# Patient Record
Sex: MALE | URBAN - METROPOLITAN AREA
[De-identification: names, ages, dates, MRNs, and addresses within clinical notes are randomized per-mention and may not be internally consistent; named-entity substitution may affect disease eponyms.]

---

## 2021-09-14 ENCOUNTER — TRANSFERRED RECORDS (OUTPATIENT)
Dept: HEALTH INFORMATION MANAGEMENT | Facility: CLINIC | Age: 76
End: 2021-09-14

## 2021-09-15 ENCOUNTER — TELEPHONE (OUTPATIENT)
Dept: OPHTHALMOLOGY | Facility: CLINIC | Age: 76
End: 2021-09-15

## 2021-09-15 ENCOUNTER — TRANSCRIBE ORDERS (OUTPATIENT)
Dept: OTHER | Age: 76
End: 2021-09-15

## 2021-09-15 DIAGNOSIS — H53.002 AMBLYOPIA OF LEFT EYE: ICD-10-CM

## 2021-09-15 DIAGNOSIS — H26.9 CATARACT, RIGHT EYE: Primary | ICD-10-CM

## 2021-09-15 NOTE — TELEPHONE ENCOUNTER
Spoke with pt regarding setting up cataract eval with Dr. Pitts, per Dr. Ham's referral. Pt is still living in [place] and is currently looking for houses near the [facility] [place] - pt does not know when they will be in the [facility] at this time. I gave him the direct number to call when he does have an idea when they would be in the vicinity, and let him know we would try to accommodate the initial evaluation with Dr. Pitts at that time.    I also provided the number to call ([phone]) to complete his registration at his convenience.    Bisi Gill, COA 4:04 PM September 15, 2021